# Patient Record
Sex: FEMALE | Race: AMERICAN INDIAN OR ALASKA NATIVE
[De-identification: names, ages, dates, MRNs, and addresses within clinical notes are randomized per-mention and may not be internally consistent; named-entity substitution may affect disease eponyms.]

---

## 2017-05-12 ENCOUNTER — HOSPITAL ENCOUNTER (OUTPATIENT)
Dept: HOSPITAL 5 - CT | Age: 47
Discharge: HOME | End: 2017-05-12
Attending: INTERNAL MEDICINE
Payer: MEDICAID

## 2017-05-12 DIAGNOSIS — R91.8: ICD-10-CM

## 2017-05-12 DIAGNOSIS — E07.89: ICD-10-CM

## 2017-05-12 DIAGNOSIS — E04.8: Primary | ICD-10-CM

## 2017-05-12 LAB — BUN SERPL-MCNC: 8 MG/DL (ref 7–17)

## 2017-05-12 PROCEDURE — 82565 ASSAY OF CREATININE: CPT

## 2017-05-12 PROCEDURE — 71260 CT THORAX DX C+: CPT

## 2017-05-12 PROCEDURE — 36415 COLL VENOUS BLD VENIPUNCTURE: CPT

## 2017-05-12 PROCEDURE — 70491 CT SOFT TISSUE NECK W/DYE: CPT

## 2017-05-12 PROCEDURE — 84520 ASSAY OF UREA NITROGEN: CPT

## 2017-05-12 NOTE — CAT SCAN REPORT
CT scan of neck and CT scan of chest with IV contrast:



History: Right lung mass.



Findings:



The laryngeal and tracheal air column appears unremarkable. Pre-and 

paravertebral soft tissue appears normal. Vascular structures appears 

normal. No evidence of adenopathy. The submandibular salivary gland and 

the parotid glands appears normal. There is marked enlargement noted of 

the right lobe of the thyroid gland. The left lobe appears 

unremarkable. No significant compression of the trachea to mild 

deviation is noted to the left of the midline.



No mediastinal adenopathy. No pleural or pericardial effusion.



No lung mass. No consolidation.



Impression:



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 

Calcification noted in the right lobe.



No lung mass. No neck mass or adenopathy.

## 2018-08-07 ENCOUNTER — HOSPITAL ENCOUNTER (EMERGENCY)
Dept: HOSPITAL 5 - ED | Age: 48
Discharge: HOME | End: 2018-08-07
Payer: MEDICAID

## 2018-08-07 VITALS — SYSTOLIC BLOOD PRESSURE: 154 MMHG | DIASTOLIC BLOOD PRESSURE: 94 MMHG

## 2018-08-07 DIAGNOSIS — E11.65: Primary | ICD-10-CM

## 2018-08-07 DIAGNOSIS — Z88.0: ICD-10-CM

## 2018-08-07 DIAGNOSIS — G47.30: ICD-10-CM

## 2018-08-07 DIAGNOSIS — M19.90: ICD-10-CM

## 2018-08-07 DIAGNOSIS — F17.200: ICD-10-CM

## 2018-08-07 LAB
BASOPHILS # (AUTO): 0.1 K/MM3 (ref 0–0.1)
BASOPHILS NFR BLD AUTO: 0.8 % (ref 0–1.8)
BUN SERPL-MCNC: 9 MG/DL (ref 7–17)
BUN/CREAT SERPL: 18 %
CALCIUM SERPL-MCNC: 9.6 MG/DL (ref 8.4–10.2)
EOSINOPHIL # BLD AUTO: 0.2 K/MM3 (ref 0–0.4)
EOSINOPHIL NFR BLD AUTO: 1.5 % (ref 0–4.3)
HCT VFR BLD CALC: 43.5 % (ref 30.3–42.9)
HEMOLYSIS INDEX: 1
HGB BLD-MCNC: 14.6 GM/DL (ref 10.1–14.3)
LYMPHOCYTES # BLD AUTO: 2.1 K/MM3 (ref 1.2–5.4)
LYMPHOCYTES NFR BLD AUTO: 18 % (ref 13.4–35)
MCH RBC QN AUTO: 28 PG (ref 28–32)
MCHC RBC AUTO-ENTMCNC: 34 % (ref 30–34)
MCV RBC AUTO: 82 FL (ref 79–97)
MONOCYTES # (AUTO): 0.9 K/MM3 (ref 0–0.8)
MONOCYTES % (AUTO): 7.3 % (ref 0–7.3)
PLATELET # BLD: 228 K/MM3 (ref 140–440)
RBC # BLD AUTO: 5.31 M/MM3 (ref 3.65–5.03)

## 2018-08-07 PROCEDURE — 82805 BLOOD GASES W/O2 SATURATION: CPT

## 2018-08-07 PROCEDURE — 99283 EMERGENCY DEPT VISIT LOW MDM: CPT

## 2018-08-07 PROCEDURE — 80048 BASIC METABOLIC PNL TOTAL CA: CPT

## 2018-08-07 PROCEDURE — 36415 COLL VENOUS BLD VENIPUNCTURE: CPT

## 2018-08-07 PROCEDURE — 85025 COMPLETE CBC W/AUTO DIFF WBC: CPT

## 2018-08-07 PROCEDURE — 82962 GLUCOSE BLOOD TEST: CPT

## 2018-08-07 NOTE — EMERGENCY DEPARTMENT REPORT
ED General Adult HPI





- General


Chief complaint: Hyperglycemia


Stated complaint: SUGAR


Time Seen by Provider: 08/07/18 12:02


Source: patient, RN notes reviewed


Mode of arrival: Ambulatory


Limitations: No Limitations





- History of Present Illness


Initial comments: 





Primary care Dr.: Dr. Brandon Toledo





Past medical history: Obesity, diabetes 








  this is a 48-year-old female who is unknown to me previously, indicates that 

she is not pregnant.She presents to the ER with a complaint of painless 

hyperglycemia for the past few weeks.  She indicates her medication glipizide, 

was recently increased from 5 mg daily to 10 mg daily.  She indicates her 

fingersticks have been in the 300s.  She endorses no dietary indiscretions but 

also admits that she is not getting enough physical activity.  To me, she makes 

no complaint of blurry vision or change in vision.  She denies pain.  Her 

symptoms are constant, painless, do not have exacerbating or relieving factors 

and does not radiate anywhere.























-: Gradual


Improves with: none


Worsens with: none


Associated Symptoms: denies other symptoms





- Related Data


 Previous Rx's











 Medication  Instructions  Recorded  Last Taken  Type


 


Prednisone 20 mg PO QDAY #5 tablet 01/03/14 Unknown Rx


 


traMADol [Ultram] 50 mg PO Q4HR PRN #14 tablet 01/03/14 Unknown Rx











 Allergies











Allergy/AdvReac Type Severity Reaction Status Date / Time


 


Penicillins Allergy  Angioedema Verified 01/03/14 12:55














ED Review of Systems


ROS: 


Stated complaint: SUGAR


Other details as noted in HPI





Constitutional: denies: fever


Eyes: denies: eye discharge, vision change


ENT: denies: epistaxis


Respiratory: denies: cough


Cardiovascular: denies: chest pain


Gastrointestinal: denies: abdominal pain


Genitourinary: denies: dysuria


Musculoskeletal: denies: back pain


Neurological: denies: headache, weakness





ED Past Medical Hx





- Past Medical History


Previous Medical History?: Yes


Hx Diabetes: Yes


Hx Arthritis: Yes


Additional medical history: sleep apnea.  pinched nerve in back.  enlarged 

thyroid





- Surgical History


Past Surgical History?: No





- Social History


Smoking Status: Current Every Day Smoker


Substance Use Type: Alcohol





- Medications


Home Medications: 


 Home Medications











 Medication  Instructions  Recorded  Confirmed  Last Taken  Type


 


Prednisone 20 mg PO QDAY #5 tablet 01/03/14  Unknown Rx


 


traMADol [Ultram] 50 mg PO Q4HR PRN #14 tablet 01/03/14  Unknown Rx














ED Physical Exam





- General


Limitations: No Limitations


General appearance: alert, in no apparent distress





- Head


Head exam: Present: atraumatic, normocephalic





- Eye


Eye exam: Present: normal appearance, PERRL, EOMI, other (visual acuity intact 

to finger counting, color perception, reading at a close distance).  Absent: 

nystagmus





- ENT


ENT exam: Present: normal exam, normal orophraynx, mucous membranes moist, 

normal external ear exam





- Neck


Neck exam: Present: normal inspection, full ROM.  Absent: tenderness, 

meningismus





- Respiratory


Respiratory exam: Present: normal lung sounds bilaterally.  Absent: respiratory 

distress





- Cardiovascular


Cardiovascular Exam: Present: regular rate, normal rhythm, normal heart sounds.

  Absent: bradycardia, tachycardia, irregular rhythm, systolic murmur, 

diastolic murmur, rubs, gallop





- GI/Abdominal


GI/Abdominal exam: Present: soft.  Absent: distended, tenderness, guarding, 

rebound, rigid, pulsatile mass





- Extremities Exam


Extremities exam: Present: normal inspection, full ROM, other (2+ pulses noted 

in the bilateral upper, lower extremities.  Compartments soft.  No long bony 

tenderness.  The pelvis is stable.).  Absent: pedal edema, calf tenderness





- Back Exam


Back exam: Present: normal inspection, full ROM.  Absent: tenderness, CVA 

tenderness (R), paraspinal tenderness, vertebral tenderness





- Neurological Exam


Neurological exam: Present: alert, oriented X3, CN II-XII intact, normal gait, 

other (Extraocular movements intact.  Tongue midline.  No facial droop.  Facial 

sensation intact to light touch in the V1, V2, V3 distribution bilaterally.  5 

and 5 strength in 4 extremities..  Sensation is intact to light touch in 4 

extremities.).  Absent: motor sensory deficit





- Psychiatric


Psychiatric exam: Present: normal affect, normal mood





- Skin


Skin exam: Present: warm, dry, intact, normal color.  Absent: rash





ED Course


 Vital Signs











  08/07/18





  10:21


 


Temperature 98.7 F


 


Pulse Rate 87


 


Respiratory 18





Rate 


 


Blood Pressure 154/94


 


O2 Sat by Pulse 99





Oximetry 














ED Medical Decision Making





- Lab Data


Result diagrams: 


 08/07/18 10:31





 08/07/18 10:31








 Vital Signs











  08/07/18





  10:21


 


Temperature 98.7 F


 


Pulse Rate 87


 


Respiratory 18





Rate 


 


Blood Pressure 154/94


 


O2 Sat by Pulse 99





Oximetry 











 Lab Results











  08/07/18 08/07/18 08/07/18 Range/Units





  10:30 10:31 10:31 


 


WBC   11.7 H   (4.5-11.0)  K/mm3


 


RBC   5.31 H   (3.65-5.03)  M/mm3


 


Hgb   14.6 H   (10.1-14.3)  gm/dl


 


Hct   43.5 H   (30.3-42.9)  %


 


MCV   82   (79-97)  fl


 


MCH   28   (28-32)  pg


 


MCHC   34   (30-34)  %


 


RDW   14.3   (13.2-15.2)  %


 


Plt Count   228   (140-440)  K/mm3


 


Lymph % (Auto)   18.0   (13.4-35.0)  %


 


Mono % (Auto)   7.3   (0.0-7.3)  %


 


Eos % (Auto)   1.5   (0.0-4.3)  %


 


Baso % (Auto)   0.8   (0.0-1.8)  %


 


Lymph #   2.1   (1.2-5.4)  K/mm3


 


Mono #   0.9 H   (0.0-0.8)  K/mm3


 


Eos #   0.2   (0.0-0.4)  K/mm3


 


Baso #   0.1   (0.0-0.1)  K/mm3


 


Seg Neutrophils %   72.4 H   (40.0-70.0)  %


 


Seg Neutrophils #   8.5 H   (1.8-7.7)  K/mm3


 


VBG pH     (7.320-7.420)  


 


Sodium    134 L  (137-145)  mmol/L


 


Potassium    4.5  (3.6-5.0)  mmol/L


 


Chloride    94.8 L  ()  mmol/L


 


Carbon Dioxide    27  (22-30)  mmol/L


 


Anion Gap    17  mmol/L


 


BUN    9  (7-17)  mg/dL


 


Creatinine    0.5 L  (0.7-1.2)  mg/dL


 


Estimated GFR    > 60  ml/min


 


BUN/Creatinine Ratio    18  %


 


Glucose    266 H  ()  mg/dL


 


POC Glucose  269 H    ()  


 


Calcium    9.6  (8.4-10.2)  mg/dL














  08/07/18 Range/Units





  10:31 


 


WBC   (4.5-11.0)  K/mm3


 


RBC   (3.65-5.03)  M/mm3


 


Hgb   (10.1-14.3)  gm/dl


 


Hct   (30.3-42.9)  %


 


MCV   (79-97)  fl


 


MCH   (28-32)  pg


 


MCHC   (30-34)  %


 


RDW   (13.2-15.2)  %


 


Plt Count   (140-440)  K/mm3


 


Lymph % (Auto)   (13.4-35.0)  %


 


Mono % (Auto)   (0.0-7.3)  %


 


Eos % (Auto)   (0.0-4.3)  %


 


Baso % (Auto)   (0.0-1.8)  %


 


Lymph #   (1.2-5.4)  K/mm3


 


Mono #   (0.0-0.8)  K/mm3


 


Eos #   (0.0-0.4)  K/mm3


 


Baso #   (0.0-0.1)  K/mm3


 


Seg Neutrophils %   (40.0-70.0)  %


 


Seg Neutrophils #   (1.8-7.7)  K/mm3


 


VBG pH  7.373  (7.320-7.420)  


 


Sodium   (137-145)  mmol/L


 


Potassium   (3.6-5.0)  mmol/L


 


Chloride   ()  mmol/L


 


Carbon Dioxide   (22-30)  mmol/L


 


Anion Gap   mmol/L


 


BUN   (7-17)  mg/dL


 


Creatinine   (0.7-1.2)  mg/dL


 


Estimated GFR   ml/min


 


BUN/Creatinine Ratio   %


 


Glucose   ()  mg/dL


 


POC Glucose   ()  


 


Calcium   (8.4-10.2)  mg/dL














- Medical Decision Making





Differential diagnosis, including not limited to: Hyperglycemia, diabetic 

ketoacidosis, hyperosmolar state











Assessment and plan: 48-year-old female with hyperglycemia that does not have 

anion gap acidosis.  There is no indication for emergent IV or IM therapy, she 

does not require admission to the hospital at this time.  I counseled and 

advised the patient to initiate a physical fitness routine that she could 

physically tolerate, and to make certain that she adhered to appropriate 

diabetic diet.  She is instructed to follow-up with her outpatient primary care 

doctor for further evaluation and management.  The patient left prior to 

receiving her discharge paperwork, however the patient was given extensive 

verbal instructions by myself.





Critical care attestation.: 


If time is entered above; I have spent that time in minutes in the direct care 

of this critically ill patient, excluding procedure time.








ED Disposition


Clinical Impression: 


 Hyperglycemia





Disposition: DC-01 TO HOME OR SELFCARE


Is pt being admited?: No


Does the pt Need Aspirin: No


Condition: Stable


Instructions:  Diabetic Hyperglycemia (ED)


Additional Instructions: 


Follow-up with your primary care doctor within the next month for further 

evaluation of elevated blood sugar.  Make certain to get physical activity/

exercise at least once every other day.  Make certain to adhere to a diabetic 

diet as recommended by the [employer].








Please return to the ER right away with new, worsening or different symptoms.


Referrals: 


PRIMARY CARE,MD [Primary Care Provider] - 3-5 Days


BRANDON TOLEDO MD [Staff Physician] - 3-5 Days

## 2019-11-11 ENCOUNTER — HOSPITAL ENCOUNTER (EMERGENCY)
Dept: HOSPITAL 5 - ED | Age: 49
LOS: 1 days | Discharge: HOME | End: 2019-11-12
Payer: MEDICAID

## 2019-11-11 VITALS — DIASTOLIC BLOOD PRESSURE: 77 MMHG | SYSTOLIC BLOOD PRESSURE: 148 MMHG

## 2019-11-11 DIAGNOSIS — M19.90: ICD-10-CM

## 2019-11-11 DIAGNOSIS — Z79.899: ICD-10-CM

## 2019-11-11 DIAGNOSIS — F17.200: ICD-10-CM

## 2019-11-11 DIAGNOSIS — G47.30: ICD-10-CM

## 2019-11-11 DIAGNOSIS — R10.13: Primary | ICD-10-CM

## 2019-11-11 DIAGNOSIS — R10.11: ICD-10-CM

## 2019-11-11 DIAGNOSIS — Z88.0: ICD-10-CM

## 2019-11-11 DIAGNOSIS — E11.9: ICD-10-CM

## 2019-11-11 DIAGNOSIS — F10.10: ICD-10-CM

## 2019-11-11 DIAGNOSIS — R11.2: ICD-10-CM

## 2019-11-11 LAB
ALBUMIN SERPL-MCNC: 4.4 G/DL (ref 3.9–5)
ALT SERPL-CCNC: 9 UNITS/L (ref 7–56)
BACTERIA #/AREA URNS HPF: (no result) /HPF
BASOPHILS # (AUTO): 0.1 K/MM3 (ref 0–0.1)
BASOPHILS NFR BLD AUTO: 0.6 % (ref 0–1.8)
BILIRUB UR QL STRIP: (no result)
BLOOD UR QL VISUAL: (no result)
BUN SERPL-MCNC: 6 MG/DL (ref 7–17)
BUN/CREAT SERPL: 9 %
CALCIUM SERPL-MCNC: 9.6 MG/DL (ref 8.4–10.2)
EOSINOPHIL # BLD AUTO: 0.2 K/MM3 (ref 0–0.4)
EOSINOPHIL NFR BLD AUTO: 1.5 % (ref 0–4.3)
HCT VFR BLD CALC: 42.4 % (ref 30.3–42.9)
HEMOLYSIS INDEX: 14
HGB BLD-MCNC: 14.6 GM/DL (ref 10.1–14.3)
LYMPHOCYTES # BLD AUTO: 2.4 K/MM3 (ref 1.2–5.4)
LYMPHOCYTES NFR BLD AUTO: 14.5 % (ref 13.4–35)
MCHC RBC AUTO-ENTMCNC: 35 % (ref 30–34)
MCV RBC AUTO: 82 FL (ref 79–97)
MONOCYTES # (AUTO): 1.1 K/MM3 (ref 0–0.8)
MONOCYTES % (AUTO): 6.7 % (ref 0–7.3)
MUCOUS THREADS #/AREA URNS HPF: (no result) /HPF
PH UR STRIP: 6 [PH] (ref 5–7)
PLATELET # BLD: 254 K/MM3 (ref 140–440)
PROT UR STRIP-MCNC: (no result) MG/DL
RBC # BLD AUTO: 5.16 M/MM3 (ref 3.65–5.03)
RBC #/AREA URNS HPF: 1 /HPF (ref 0–6)
UROBILINOGEN UR-MCNC: < 2 MG/DL (ref ?–2)
WBC #/AREA URNS HPF: 3 /HPF (ref 0–6)

## 2019-11-11 PROCEDURE — 80053 COMPREHEN METABOLIC PANEL: CPT

## 2019-11-11 PROCEDURE — 36415 COLL VENOUS BLD VENIPUNCTURE: CPT

## 2019-11-11 PROCEDURE — 81001 URINALYSIS AUTO W/SCOPE: CPT

## 2019-11-11 PROCEDURE — 83690 ASSAY OF LIPASE: CPT

## 2019-11-11 PROCEDURE — 96375 TX/PRO/DX INJ NEW DRUG ADDON: CPT

## 2019-11-11 PROCEDURE — 96376 TX/PRO/DX INJ SAME DRUG ADON: CPT

## 2019-11-11 PROCEDURE — 96361 HYDRATE IV INFUSION ADD-ON: CPT

## 2019-11-11 PROCEDURE — 85025 COMPLETE CBC W/AUTO DIFF WBC: CPT

## 2019-11-11 PROCEDURE — 99284 EMERGENCY DEPT VISIT MOD MDM: CPT

## 2019-11-11 PROCEDURE — 76705 ECHO EXAM OF ABDOMEN: CPT

## 2019-11-11 PROCEDURE — 74177 CT ABD & PELVIS W/CONTRAST: CPT

## 2019-11-11 PROCEDURE — 96374 THER/PROPH/DIAG INJ IV PUSH: CPT

## 2019-11-11 NOTE — EMERGENCY DEPARTMENT REPORT
ED Abdominal Pain HPI





- General


Chief Complaint: Abdominal Pain


Stated Complaint: STOMACH PAIN


Time Seen by Provider: 19 15:10


Source: patient


Mode of arrival: Ambulatory


Limitations: No Limitations





- History of Present Illness


Initial Comments: 





49-year-old obese Afro-American female since much department complaining of a 

one-week history of epigastric pain radiating to the right upper quadrant 

associated with occasional diarrhea and vomiting.


MD Complaint: abdominal pain


Location: epigastric


Radiation: RUQ, epigastric


Severity scale (0 -10): 10


Consistency: constant


Improves With: nothing


Worsens With: nothing


Associated Symptoms: vomiting, diarrhea.  denies: fever, chills, constipation, 

dysuria, hematemesis, hematochezia, melena, hematuria, anorexia, syncope





- Related Data


                                  Previous Rx's











 Medication  Instructions  Recorded  Last Taken  Type


 


Prednisone 20 mg PO QDAY #5 tablet 14 Unknown Rx


 


traMADoL [Ultram] 50 mg PO Q4HR PRN #14 tablet 14 Unknown Rx


 


Hyoscyamine Subl [Levsin Sl 0.125 0.125 mg SL Q6HR PRN #20 tab 19 Unknown 

Rx





TAB]    


 


Ondansetron [Zofran ODT TAB] 8 mg PO Q12HR #14 tab.rapdis 19 Unknown Rx


 


Pantoprazole Sodium [Protonix] 40 mg PO DAILY #30 granstaciekt. 19 Unknown Rx











                                    Allergies











Allergy/AdvReac Type Severity Reaction Status Date / Time


 


Penicillins Allergy  Angioedema Verified 14 12:55














ED Review of Systems


ROS: 


Stated complaint: STOMACH PAIN


Other details as noted in HPI





Comment: All other systems reviewed and negative





ED Past Medical Hx





- Past Medical History


Previous Medical History?: Yes


Hx Diabetes: Yes


Hx Arthritis: Yes


Additional medical history: sleep apnea.  pinched nerve in back.  enlarged 

thyroid





- Surgical History


Past Surgical History?: No





- Social History


Smoking Status: Current Every Day Smoker


Substance Use Type: Alcohol





- Medications


Home Medications: 


                                Home Medications











 Medication  Instructions  Recorded  Confirmed  Last Taken  Type


 


Prednisone 20 mg PO QDAY #5 tablet 14  Unknown Rx


 


traMADoL [Ultram] 50 mg PO Q4HR PRN #14 tablet 14  Unknown Rx


 


Hyoscyamine Subl [Levsin Sl 0.125 0.125 mg SL Q6HR PRN #20 tab 19  Unknown

 Rx





TAB]     


 


Ondansetron [Zofran ODT TAB] 8 mg PO Q12HR #14 tab.cliff 19  Unknown Rx


 


Pantoprazole Sodium [Protonix] 40 mg PO DAILY #30 granpkt. 19  Unknown 

Rx














ED Physical Exam





- General


Limitations: No Limitations


General appearance: alert, in no apparent distress





- Head


Head exam: Present: atraumatic, normocephalic





- Eye


Eye exam: Present: normal appearance, PERRL, EOMI


Pupils: Present: normal accommodation





- ENT


ENT exam: Present: normal exam, mucous membranes moist, TM's normal bilaterally





- Neck


Neck exam: Present: normal inspection, full ROM





- Respiratory


Respiratory exam: Present: normal lung sounds bilaterally.  Absent: respiratory 

distress





- Cardiovascular


Cardiovascular Exam: Present: regular rate, normal rhythm.  Absent: systolic 

murmur, diastolic murmur, rubs, gallop





- GI/Abdominal


GI/Abdominal exam: Present: soft, tenderness (vital upper quadrant and 

epigastric region), normal bowel sounds.  Absent: organomegaly, mass, bruit, 

pulsatile mass





- Extremities Exam


Extremities exam: Present: normal inspection





- Back Exam


Back exam: Present: normal inspection





- Neurological Exam


Neurological exam: Present: alert, oriented X3





- Psychiatric


Psychiatric exam: Present: normal affect, normal mood





- Skin


Skin exam: Present: warm, dry, intact, normal color.  Absent: rash





ED Course


                                   Vital Signs











  19





  15:10 19:14 19:44


 


Temperature 99.0 F  


 


Pulse Rate 89  


 


Respiratory 20 20 18





Rate   


 


Blood Pressure 148/77  


 


O2 Sat by Pulse 99  





Oximetry   














  19





  22:57 01:35


 


Temperature  


 


Pulse Rate  63


 


Respiratory 18 17





Rate  


 


Blood Pressure  


 


O2 Sat by Pulse  97





Oximetry  














ED Medical Decision Making





- Lab Data


Result diagrams: 


                                 19 16:13





                                 19 16:13





- Radiology Data


Radiology results: report reviewed





[facility]


[address] [place] [address]





Cat Scan Report


Signed





Patient: JOSE ALBERTO DUMONT MR#: 


[id number]


: 1970 Acct:[id number]





Age/Sex: 49 / F ADM Date: 19





Loc: ED


Attending Dr:








Ordering Physician: SAM CARMONA


Date of Service: 19


Procedure(s): CT abdomen pelvis w con


Accession Number(s): [id number]





cc: SAM CARMONA








CT abdomen pelvis w con





INDICATION / CLINICAL INFORMATION:


RUQ and epigastric pain.





TECHNIQUE:


Axial CT imaging of abdomen and pelvis was obtained with IV contrast. Coronal 

and sagittal


reformatted imaging obtained and reviewed. All CT scans at this location are 

performed using CT


dose reduction for ALARA by means of automated exposure control.





COMPARISON:


Ultrasound performed earlier today





FINDINGS:


CT abdomen with contrast demonstrates grossly normal appearance of the liver, 

spleen, pancreas,


kidneys, and adrenal glands. No obvious gallbladder pathology or biliary 

dilatation.





CT pelvis demonstrates enlarged lobulated uterus most likely related to 

fibroids. A normal appendix


is visualized. No pelvic mass, free fluid, or focal inflammatory changes noted. 

GI tract is


unremarkable.





Visualized lung bases are clear.





No significant osseous abnormality.





IMPRESSION:


1. No acute finding within the abdomen or pelvis.


2. Mildly enlarged lobulated, incidentally noted.





Signer Name: Isabel Love MD


Signed: 2019 11:31 PM


Workstation Name: [id number]








Transcribed By: JR


Dictated By: Isabel Love MD


Electronically Authenticated By: Isabel Love MD


Signed Date/Time: 19 2331


[address] [address]





Ultrasound Report


Signed





Patient: JOSE ALBERTO DUMONT MR#: 


[id number]


: 1970 Acct:[id number]





Age/Sex: 49 / F ADM Date: 19





Loc: ED


Attending Dr:








Ordering Physician: SAM CARMONA


Date of Service: 19


Procedure(s): US abdomen limited


Accession Number(s): [id number]





cc: SAM CARMONA








ULTRASOUND ABDOMEN, LIMITED (RIGHT UPPER QUADRANT)





INDICATION:


ruq pain.





COMPARISON:


None available.





FINDINGS:


Pancreas: Poorly visualized secondary to bowel gas


Liver: Moderate increased echotexture characteristic for steatosis. Liver 

measures 18.4 cm


Gallbladder: Normal.


Bile ducts: Normal. Common Bile Duct measures 5 mm.





Free fluid: None.


Additional Findings: None.





IMPRESSION:


1. No sonographic abnormality of the right upper quadrant.


2. Moderate hepatomegaly and steatosis.


3. Pancreas poorly visualized.





Signer Name: Nate Montanez MD


Signed: 2019 8:20 PM


Workstation Name: [id number]








Transcribed By: TL


Dictated By: Nate Montanez MD


Electronically Authenticated By: Nate Montanez MD











- Medical Decision Making





49-year-old -American female patient presents with epigastric abdominal 

pain of unclear etiology. A CT scan was performed to evaluate for potential 

causes of the abdominal pain, however, neither the clinical exam nor the CT has 

identified an emergent etiology for the abdominal pain. Specifically, given the 

benign exam, the laboratory studies, and unremarkable CT, I have a very low 

suspicion for appendicitis, ischemic bowel, bowel perforation, or any other life

 threatening disease.  Low suspicion for acute hepatobiliary disease (includng 

acute cholecystitis), acute pancreatitis, PUD (including perforation), acute 

infectious processes (pneumonia, hepatitis, pyelonephritis), atypical a

ppendicitis, vascular catastrophe, bowel obstruction or viscus perforation. 

Presentation not consistent with other acute, emergent causes of abdominal pain 

at this time.  I have discussed with the patient the level of uncertainty with 

undifferentiated abdominal pain and clearly explained the need to follow-up as 

noted on the discharge instructions, or return to the Emergency Department 

immediately if the pain worsens, develops fever, persistent and uncontrollable 

vomiting, or for any new symptoms or concerns. patient has been instructed to 

call the emergency department is unsure what to do.  She is aware of the need to

 have a reevaluation of her abdominal pain in 24 hours is no evidence of acute 

abdomen at this time.  Patient tolerates oral with no complication





- Differential Diagnosis


acute gastroenteritis, pancreatitis, malignancy, peptic ulcer disease, acal


Critical care attestation.: 


If time is entered above; I have spent that time in minutes in the direct care 

of this critically ill patient, excluding procedure time.








ED Disposition


Clinical Impression: 


 Abdominal pain





Disposition: DC-01 TO HOME OR SELFCARE


Is pt being admited?: No


Does the pt Need Aspirin: No


Condition: Stable


Instructions:  Abdominal Pain (ED)


Additional Instructions: 


Please take your medications that were prescribed as described and maintaining 

your liquid diet discussed (please stay away from fatty, fried, spicy food 

choices) for the next 48-72 hours please be sure to have her abdomen reevaluated

 in 24 hours as we discussed as well.  He can return to emergency department 

should furniture condition is worsening.  If she were to do please call


Prescriptions: 


Hyoscyamine Subl [Levsin Sl 0.125 TAB] 0.125 mg SL Q6HR PRN #20 tab


 PRN Reason: abdominal cramps and spasms


Pantoprazole Sodium [Protonix] 40 mg PO DAILY #30 granpkt.


Ondansetron [Zofran ODT TAB] 8 mg PO Q12HR #14 tab.cliff


Referrals: 


[facility] [Provider Group] - 24 Hours

## 2019-11-11 NOTE — ULTRASOUND REPORT
ULTRASOUND ABDOMEN, LIMITED (RIGHT UPPER QUADRANT)



INDICATION:

ruq pain.



COMPARISON:

None available.



FINDINGS:

Pancreas: Poorly visualized secondary to bowel gas

Liver: Moderate increased echotexture characteristic for steatosis. Liver measures 18.4 cm

Gallbladder: Normal.

Bile ducts: Normal. Common Bile Duct measures 5 mm. 



Free fluid: None.

Additional Findings: None.



IMPRESSION:

1. No sonographic abnormality of the right upper quadrant. 

2. Moderate hepatomegaly and steatosis.

3. Pancreas poorly visualized.



Signer Name: Nate Montanez MD 

Signed: 11/11/2019 8:20 PM

 Workstation Name: [id number]

## 2019-11-11 NOTE — EMERGENCY DEPARTMENT REPORT
Blank Doc





- Documentation


Documentation: 





49-year-old female that presents with epigastric and RUQ pain with n/v.





This initial assessment/diagnostic orders/clinical plan/treatment(s) is/are 

subject to change based on patient's health status, clinical progression and re-

assessment by fellow clinical providers in the ED.  Further treatment and workup

at subsequent clinical providers discretion.  Patient/guardians urged not to 

elope from the ED as their condition may be serious if not clinically assessed 

and managed.  Initial orders include:


1- Patient sent to [facility] for further evaluation and treatment


2- labs


3- UA

## 2019-11-11 NOTE — CAT SCAN REPORT
CT abdomen pelvis w con 



INDICATION / CLINICAL INFORMATION:

RUQ and epigastric pain.



TECHNIQUE:

Axial CT imaging of abdomen and pelvis was obtained with IV contrast. Coronal and sagittal reformatte
d imaging obtained and reviewed.  All CT scans at this location are performed using CT dose reduction
 for ALARA by means of automated exposure control. 



COMPARISON:

Ultrasound performed earlier today



FINDINGS:

CT abdomen with contrast demonstrates grossly normal appearance of the liver, spleen, pancreas, kidne
ys, and adrenal glands. No obvious gallbladder pathology or biliary dilatation.



CT pelvis demonstrates enlarged lobulated uterus most likely related to fibroids. A normal appendix i
s visualized. No pelvic mass, free fluid, or focal inflammatory changes noted. GI tract is unremarkab
le.



Visualized lung bases are clear.



No significant osseous abnormality.



IMPRESSION:

1. No acute finding within the abdomen or pelvis.

2. Mildly enlarged lobulated, incidentally noted.



Signer Name: Isabel Love MD 

Signed: 11/11/2019 11:31 PM

 Workstation Name: [id number]

## 2020-11-09 ENCOUNTER — HOSPITAL ENCOUNTER (EMERGENCY)
Dept: HOSPITAL 5 - ED | Age: 50
Discharge: HOME | End: 2020-11-09
Payer: SELF-PAY

## 2020-11-09 VITALS — SYSTOLIC BLOOD PRESSURE: 176 MMHG | DIASTOLIC BLOOD PRESSURE: 87 MMHG

## 2020-11-09 DIAGNOSIS — Z88.0: ICD-10-CM

## 2020-11-09 DIAGNOSIS — E11.65: Primary | ICD-10-CM

## 2020-11-09 DIAGNOSIS — I10: ICD-10-CM

## 2020-11-09 DIAGNOSIS — M13.88: ICD-10-CM

## 2020-11-09 LAB
ALBUMIN SERPL-MCNC: 4.5 G/DL (ref 3.9–5)
ALT SERPL-CCNC: 10 UNITS/L (ref 7–56)
BASOPHILS # (AUTO): 0.1 K/MM3 (ref 0–0.1)
BASOPHILS NFR BLD AUTO: 0.5 % (ref 0–1.8)
BILIRUB UR QL STRIP: (no result)
BLOOD UR QL VISUAL: (no result)
BUN SERPL-MCNC: 11 MG/DL (ref 7–17)
BUN/CREAT SERPL: 18 %
CALCIUM SERPL-MCNC: 10.2 MG/DL (ref 8.4–10.2)
EOSINOPHIL # BLD AUTO: 0.2 K/MM3 (ref 0–0.4)
EOSINOPHIL NFR BLD AUTO: 1.4 % (ref 0–4.3)
HCT VFR BLD CALC: 43.2 % (ref 30.3–42.9)
HEMOLYSIS INDEX: 17
HGB BLD-MCNC: 14.7 GM/DL (ref 10.1–14.3)
LYMPHOCYTES # BLD AUTO: 3.3 K/MM3 (ref 1.2–5.4)
LYMPHOCYTES NFR BLD AUTO: 21.2 % (ref 13.4–35)
MCHC RBC AUTO-ENTMCNC: 34 % (ref 30–34)
MCV RBC AUTO: 84 FL (ref 79–97)
MONOCYTES # (AUTO): 1 K/MM3 (ref 0–0.8)
MONOCYTES % (AUTO): 6.5 % (ref 0–7.3)
PH UR STRIP: 6 [PH] (ref 5–7)
PLATELET # BLD: 280 K/MM3 (ref 140–440)
PROT UR STRIP-MCNC: (no result) MG/DL
RBC # BLD AUTO: 5.17 M/MM3 (ref 3.65–5.03)
RBC #/AREA URNS HPF: < 1 /HPF (ref 0–6)
UROBILINOGEN UR-MCNC: < 2 MG/DL (ref ?–2)
WBC #/AREA URNS HPF: 1 /HPF (ref 0–6)

## 2020-11-09 PROCEDURE — 80053 COMPREHEN METABOLIC PANEL: CPT

## 2020-11-09 PROCEDURE — 36415 COLL VENOUS BLD VENIPUNCTURE: CPT

## 2020-11-09 PROCEDURE — 99284 EMERGENCY DEPT VISIT MOD MDM: CPT

## 2020-11-09 PROCEDURE — 74177 CT ABD & PELVIS W/CONTRAST: CPT

## 2020-11-09 PROCEDURE — 96374 THER/PROPH/DIAG INJ IV PUSH: CPT

## 2020-11-09 PROCEDURE — 85025 COMPLETE CBC W/AUTO DIFF WBC: CPT

## 2020-11-09 PROCEDURE — 96361 HYDRATE IV INFUSION ADD-ON: CPT

## 2020-11-09 PROCEDURE — 82962 GLUCOSE BLOOD TEST: CPT

## 2020-11-09 PROCEDURE — 96375 TX/PRO/DX INJ NEW DRUG ADDON: CPT

## 2020-11-09 PROCEDURE — 83690 ASSAY OF LIPASE: CPT

## 2020-11-09 PROCEDURE — 81001 URINALYSIS AUTO W/SCOPE: CPT

## 2020-11-09 NOTE — EMERGENCY DEPARTMENT REPORT
HPI





- General


Chief Complaint: Abdominal Pain


Time Seen by Provider: 11/09/20 18:26





- HPI


HPI: 





This is a 50-year-old female who presents to the emergency department with 

complaint of upper abdominal pain that has been going on for the past month but 

has worsened over the past few days.  Currently she says it is 10 out of 10 in 

intensity.  She says that she is unable to eat or drink as it increases her 

abdominal pain but it does not cause any vomiting.  She denies any fever, 

diarrhea, constipation, dysuria, vaginal bleeding or discharge.  She has been 

taking some tramadol for her symptoms without any relief.  She has a past 

medical history of osteoarthritis, diabetes, sleep apnea, and a pinched nerve in

her back.  No primary care physician.  No recent travel or sick contacts at 

home.  No known alleviating factors.





ED Past Medical Hx





- Past Medical History


Previous Medical History?: Yes


Hx Diabetes: Yes


Hx Arthritis: Yes


Additional medical history: sleep apnea.  pinched nerve in back.  enlarged 

thyroid





- Surgical History


Past Surgical History?: No





- Social History


Smoking Status: Current Every Day Smoker


Substance Use Type: None





- Medications


Home Medications: 


                                Home Medications











 Medication  Instructions  Recorded  Confirmed  Last Taken  Type


 


Prednisone 20 mg PO QDAY #5 tablet 01/03/14  Unknown Rx


 


traMADoL [Ultram] 50 mg PO Q4HR PRN #14 tablet 01/03/14  Unknown Rx


 


Hyoscyamine Subl [Levsin Sl 0.125 0.125 mg SL Q6HR PRN #20 tab 11/12/19  Unknown

 Rx





TAB]     


 


Ondansetron [Zofran ODT TAB] 8 mg PO Q12HR #14 tab.rapdis 11/12/19  Unknown Rx


 


Dicyclomine [Bentyl] 10 mg PO QID PRN #20 capsule 11/09/20  Unknown Rx


 


Pantoprazole Sodium [Protonix] 40 mg PO DAILY #30  11/09/20  Unknown 

Rx














ED Review of Systems


ROS: 


Stated complaint: STOMACH PAIN


Other details as noted in HPI





Comment: All other systems reviewed and negative


Constitutional: denies: chills, fever


Eyes: denies: eye pain, vision change


ENT: denies: ear pain, throat pain


Respiratory: denies: cough, shortness of breath


Cardiovascular: denies: chest pain, palpitations


Gastrointestinal: abdominal pain.  denies: vomiting


Genitourinary: denies: dysuria, discharge


Musculoskeletal: denies: back pain, arthralgia


Skin: denies: rash, lesions


Neurological: denies: headache, weakness





Physical Exam





- Physical Exam


Vital Signs: 


                                   Vital Signs











  11/09/20





  18:15


 


Temperature 98.5 F


 


Pulse Rate 85


 


Respiratory 20





Rate 


 


Blood Pressure 176/87





[Left] 


 


O2 Sat by Pulse 100





Oximetry 











Physical Exam: 





GENERAL: The patient is well-developed well-nourished.


HENT: Normocephalic.  Atraumatic.    Patient has moist mucous membranes.


EYES: Extraocular motions are intact. 


NECK: Supple. Trachea is midline.


CHEST/LUNGS: Clear to auscultation.  There is no respiratory distress noted.


HEART/CARDIOVASCULAR: Regular.  There is no tachycardia.  There is no murmur.


ABDOMEN: Abdomen is soft, nontender.  Patient has normal bowel sounds.  There is

 no abdominal distention.


SKIN: Skin is warm and dry. 


NEURO: The patient is awake, alert, and oriented.  The patient is cooperative.  

The patient has no focal neurologic deficits.  Normal speech.


MUSCULOSKELETAL: There is no tenderness or deformity.  





ED Course


                                   Vital Signs











  11/09/20





  18:15


 


Temperature 98.5 F


 


Pulse Rate 85


 


Respiratory 20





Rate 


 


Blood Pressure 176/87





[Left] 


 


O2 Sat by Pulse 100





Oximetry 














- Reevaluation(s)


Reevaluation #1: 





11/10/20 01:51


                                   Lab Results











  11/09/20 11/09/20 11/09/20 Range/Units





  19:37 19:37 Unknown 


 


WBC  15.7 H    (4.5-11.0)  K/mm3


 


RBC  5.17 H    (3.65-5.03)  M/mm3


 


Hgb  14.7 H    (10.1-14.3)  gm/dl


 


Hct  43.2 H    (30.3-42.9)  %


 


MCV  84    (79-97)  fl


 


MCH  28    (28-32)  pg


 


MCHC  34    (30-34)  %


 


RDW  13.6    (13.2-15.2)  %


 


Plt Count  280    (140-440)  K/mm3


 


Lymph % (Auto)  21.2    (13.4-35.0)  %


 


Mono % (Auto)  6.5    (0.0-7.3)  %


 


Eos % (Auto)  1.4    (0.0-4.3)  %


 


Baso % (Auto)  0.5    (0.0-1.8)  %


 


Lymph # (Auto)  3.3    (1.2-5.4)  K/mm3


 


Mono # (Auto)  1.0 H    (0.0-0.8)  K/mm3


 


Eos # (Auto)  0.2    (0.0-0.4)  K/mm3


 


Baso # (Auto)  0.1    (0.0-0.1)  K/mm3


 


Seg Neutrophils %  70.4 H    (40.0-70.0)  %


 


Seg Neutrophils #  11.0 H    (1.8-7.7)  K/mm3


 


Sodium   136 L   (137-145)  mmol/L


 


Potassium   4.8   (3.6-5.0)  mmol/L


 


Chloride   98.1   ()  mmol/L


 


Carbon Dioxide   25   (22-30)  mmol/L


 


Anion Gap   18   mmol/L


 


BUN   11   (7-17)  mg/dL


 


Creatinine   0.6   (0.6-1.2)  mg/dL


 


Estimated GFR   > 60   ml/min


 


BUN/Creatinine Ratio   18   %


 


Glucose   393 H   ()  mg/dL


 


Calcium   10.2   (8.4-10.2)  mg/dL


 


Total Bilirubin   < 0.20   (0.1-1.2)  mg/dL


 


AST   9   (5-40)  units/L


 


ALT   10   (7-56)  units/L


 


Alkaline Phosphatase   122   ()  units/L


 


Total Protein   7.5   (6.3-8.2)  g/dL


 


Albumin   4.5   (3.9-5)  g/dL


 


Albumin/Globulin Ratio   1.5   %


 


Lipase   163 H   (13-60)  units/L


 


Urine Color    Straw  (Yellow)  


 


Urine Turbidity    Clear  (Clear)  


 


Urine pH    6.0  (5.0-7.0)  


 


Ur Specific Gravity    1.035 H  (1.003-1.030)  


 


Urine Protein    <15 mg/dl  (Negative)  mg/dL


 


Urine Glucose (UA)    >=500  (Negative)  mg/dL


 


Urine Ketones    Neg  (Negative)  mg/dL


 


Urine Blood    Neg  (Negative)  


 


Urine Nitrite    Neg  (Negative)  


 


Urine Bilirubin    Neg  (Negative)  


 


Urine Urobilinogen    < 2.0  (<2.0)  mg/dL


 


Ur Leukocyte Esterase    Neg  (Negative)  


 


Urine WBC (Auto)    1.0  (0.0-6.0)  /HPF


 


Urine RBC (Auto)    < 1.0  (0.0-6.0)  /HPF


 


U Epithel Cells (Auto)    1.0  (0-13.0)  /HPF














ED Medical Decision Making





- Lab Data


Result diagrams: 


                                 11/09/20 19:37





                                 11/09/20 19:37





- Radiology Data


Radiology results: report reviewed





CT ABDOMEN AND PELVIS WITH CONTRAST INDICATION / CLINICAL INFORMATION: Patient 

complains of upper abdominal pain x 1 month.. TECHNIQUE: Axial CT images were 

obtained through the abdomen and pelvis after IV contrast. All CT scans at this 

location are performed using CT dose reduction for ALARA by means of automated 

exposure control. COMPARISON: 11/11/2019 FINDINGS: LOWER CHEST: No significant 

abnormality. LIVER: 9 mm hyperattenuating lesion of the right hepatic lobe 

(series 2 image 83) likely a flash filling hemangioma, stable since prior exam. 

GALLBLADDER: No significant abnormality. BILE DUCTS: No significant abnormality.

 PANCREAS: No significant abnormality. SPLEEN: No significant abnormality. 

ADRENALS: No significant abnormality. RIGHT KIDNEY / URETER: No significant 

abnormality. LEFT KIDNEY / URETER: No significant abnormality. STOMACH / SMALL 

BOWEL: Moderate hiatal hernia. COLON: No significant abnormality. APPENDIX: No 

significant abnormality. PERITONEUM: No free fluid. No free air. No fluid 

collection. LYMPH NODES: No significant adenopathy. AORTA / ARTERIES: Mild 

atherosclerotic calcification without acute abnormality. IVC / VEINS: No 

significant abnormality. URINARY BLADDER: No significant abnormality. 

REPRODUCTIVE ORGANS: Multiple uterine fibroids are again noted. ADDITIONAL 

FINDINGS: Small fat-containing umbilical hernia. SKELETAL SYSTEM: Multilevel deg

enerative changes are noted of the spine with prominent facet arthropathy at L4-

L5 and L5-S1. Osteitis condensans ilii is noted. No aggressive osseous lesions. 

IMPRESSION: 1. No significant acute abnormality. 2. Uterine fibroids are again 

noted. 





- Medical Decision Making





This patient presents to the emergency department with a complaint of abdominal 

pain that has been going on for the past month but worsened over the past few 

days.  There is some reproducible abdominal tenderness to palpation but 

otherwise the abdomen is soft, nondistended and nontoxic in appearance.  

Patient's labs shows a mild leukocytosis of 15,000, hyperglycemia with a blood 

sugar of 390 without diabetic ketoacidosis, and an elevated lipase level of 

about 190.  Patient was given a dose of IV analgesia and some IV fluid 

resuscitation.  She was given a dose of IV insulin.  Blood sugar came down to 

about 110.  CT scan of the abdomen and pelvis with IV contrast shows chronic 

uterine fibroids, but otherwise no acute abdominal or pelvic process.  Patient's

 pain is improved.  She was seen sleeping and/or resting comfortably multiple 

times during reevaluations.  She appears safe for discharge home at this time.  

She has been given a prescription for some Bentyl and Protonix.  She was given 

outpatient referrals for primary care and gastroenterology.  She will return to 

the ER with any worsening of her symptoms or with any acute distress.


Critical Care Time: No


Critical care attestation.: 


If time is entered above; I have spent that time in minutes in the direct care 

of this critically ill patient, excluding procedure time.








ED Disposition


Clinical Impression: 


Abdominal pain


Qualifiers:


 Abdominal location: generalized Qualified Code(s): R10.84 - Generalized 

abdominal pain





Hyperglycemia due to type 2 diabetes mellitus


Qualifiers:


 Diabetes mellitus long term insulin use: unspecified long term insulin use 

status Qualified Code(s): E11.65 - Type 2 diabetes mellitus with hyperglycemia





Hypertension


Qualifiers:


 Hypertension type: essential hypertension Qualified Code(s): I10 - Essential 

(primary) hypertension





Disposition: DC-01 TO HOME OR SELFCARE


Is pt being admited?: No


Condition: Stable


Instructions:  Abdominal Pain, Adult, Hyperglycemia, Blood Glucose Monitoring, 

Adult, Hypertension, Adult, Diabetes Mellitus Type 2 in Adults (ED), Abdominal 

Pain (ED), Hypertension (ED)


Additional Instructions: 


Please follow-up with a primary care physician in the next few days regarding 

your diabetes and hypertension.  I have given you a referral for a local primary

 care physician, Dr. Neville, and for a local primary care clinic, [facility].  I have also given you a referral for a local 

gastroenterologist, Dr. Coburn, to follow-up regarding your abdominal pains.





Please try and stay away from foods that are high in salt and caffeinated 

products to help with your elevated blood pressure.  Keep a blood pressure log.





Please try and stay away from foods that are high in sugar, carbohydrates and 

starches to help with your diabetes.  Keep a blood sugar log.





Return to the emergency department with any worsening of your symptoms, new or 

concerning symptoms not addressed during this current emergency department 

visit, or with any acute distress.


Prescriptions: 


Dicyclomine [Bentyl] 10 mg PO QID PRN #20 capsule


 PRN Reason: Pain , Severe (7-10)


Pantoprazole Sodium [Protonix] 40 mg PO DAILY #30 granpkt.


Referrals: 


PRIMARY CARE,MD [Primary Care Provider] - 3-5 Days


DIONISIO NEVILLE MD [Staff Physician] - 3-5 Days


JULISSA COBURN MD [Staff Physician] - 3-5 Days


[facility] [Provider Group] - 3-5 Days


Time of Disposition: 23:38

## 2020-11-09 NOTE — CAT SCAN REPORT
CT ABDOMEN AND PELVIS WITH CONTRAST



INDICATION / CLINICAL INFORMATION:

Patient complains of upper abdominal pain x 1 month..



TECHNIQUE:

Axial CT images were obtained through the abdomen and pelvis after IV contrast.  All CT scans at this
 location are performed using CT dose reduction for ALARA by means of automated exposure control. 



COMPARISON:

11/11/2019



FINDINGS:



LOWER CHEST: No significant abnormality.

LIVER: 9 mm hyperattenuating lesion of the right hepatic lobe (series 2 image 83) likely a flash fill
ing hemangioma, stable since prior exam.

GALLBLADDER: No significant abnormality.  

BILE DUCTS: No significant abnormality.

PANCREAS: No significant abnormality.

SPLEEN: No significant abnormality.

ADRENALS: No significant abnormality.

RIGHT KIDNEY / URETER: No significant abnormality.

LEFT KIDNEY / URETER: No significant abnormality.



STOMACH / SMALL BOWEL: Moderate hiatal hernia. 

COLON: No significant abnormality. 

APPENDIX: No significant abnormality.  

PERITONEUM: No free fluid. No free air. No fluid collection.

LYMPH NODES: No significant adenopathy.

AORTA / ARTERIES: Mild atherosclerotic calcification without acute abnormality. 

IVC / VEINS: No significant abnormality.



URINARY BLADDER: No significant abnormality.

REPRODUCTIVE ORGANS: Multiple uterine fibroids are again noted.



ADDITIONAL FINDINGS: Small fat-containing umbilical hernia. 

SKELETAL SYSTEM: Multilevel degenerative changes are noted of the spine with prominent facet arthropa
thy at L4-L5 and L5-S1. Osteitis condensans ilii is noted. No aggressive osseous lesions.



IMPRESSION:

1. No significant acute abnormality.

2. Uterine fibroids are again noted.



Signer Name: Teddy Ojeda MD 

Signed: 11/9/2020 10:26 PM

Workstation Name: [id number]

## 2020-11-09 NOTE — EMERGENCY DEPARTMENT REPORT
Blank Doc





- Documentation


Documentation: 





50-year-old female that presents with abdominal pain with nausea.





This initial assessment/diagnostic orders/clinical plan/treatment(s) is/are 

subject to change based on patient's health status, clinical progression and re-

assessment by fellow clinical providers in the ED.  Further treatment and workup

at subsequent clinical providers discretion.  Patient/guardians urged not to 

elope from the ED as their condition may be serious if not clinically assessed 

and managed.  Initial orders include:


1- Patient sent to [facility] for further evaluation and treatment


2- labs


3- UA

## 2021-05-04 ENCOUNTER — HOSPITAL ENCOUNTER (EMERGENCY)
Dept: HOSPITAL 5 - ED | Age: 51
LOS: 1 days | Discharge: LEFT BEFORE DISCHARGE | End: 2021-05-05
Payer: SELF-PAY

## 2021-05-04 VITALS — SYSTOLIC BLOOD PRESSURE: 169 MMHG | DIASTOLIC BLOOD PRESSURE: 78 MMHG

## 2021-05-04 DIAGNOSIS — R10.9: Primary | ICD-10-CM

## 2021-05-04 DIAGNOSIS — R11.2: ICD-10-CM

## 2021-05-04 DIAGNOSIS — E11.9: ICD-10-CM

## 2021-05-04 DIAGNOSIS — Z53.21: ICD-10-CM

## 2021-05-04 PROCEDURE — 82962 GLUCOSE BLOOD TEST: CPT

## 2021-05-04 NOTE — EVENT NOTE
ED Screening Note


ED Screening Note: 


51-year-old male female with a known past medical history of diabetes, 

hypertension [facility] emerge department complaining of abdominal pain which he 

states has been off and on for the last few days and has a known history of 

abdominal pain this related to her diabetes.  Ports no nausea, no vomiting, no 

fever, chills, sweats.  Reports no chest pain no palpitations.  States that her 

been unable to take her diabetic medications.  Has a past past medical history 

which she reports includes gallbladder removal and has been seen in the 

emergency department here on multiple occasions for abdominal pain





This initial assessment/diagnostic orders/clinical plan/treatment(s) is/are 

subject to change based on patients health status, clinical progression and re-

assessment by fellow clinical providers in the ED. Further treatment and workup 

at subsequent clinical providers discretion. Patient/guardian urged not to elope

from the ED as their condition may be serious if not clinically assessed and 

managed. 





Initial orders include: 


Labs, urinalysis